# Patient Record
Sex: FEMALE | Race: WHITE
[De-identification: names, ages, dates, MRNs, and addresses within clinical notes are randomized per-mention and may not be internally consistent; named-entity substitution may affect disease eponyms.]

---

## 2017-12-24 ENCOUNTER — HEALTH MAINTENANCE LETTER (OUTPATIENT)
Age: 22
End: 2017-12-24

## 2020-01-17 ENCOUNTER — ANCILLARY PROCEDURE (OUTPATIENT)
Dept: GENERAL RADIOLOGY | Facility: CLINIC | Age: 25
End: 2020-01-17
Attending: INTERNAL MEDICINE
Payer: COMMERCIAL

## 2020-01-17 ENCOUNTER — OFFICE VISIT (OUTPATIENT)
Dept: URGENT CARE | Facility: URGENT CARE | Age: 25
End: 2020-01-17
Payer: COMMERCIAL

## 2020-01-17 VITALS
WEIGHT: 144 LBS | HEART RATE: 78 BPM | OXYGEN SATURATION: 97 % | SYSTOLIC BLOOD PRESSURE: 133 MMHG | BODY MASS INDEX: 21.27 KG/M2 | TEMPERATURE: 97.3 F | DIASTOLIC BLOOD PRESSURE: 76 MMHG

## 2020-01-17 DIAGNOSIS — M79.671 RIGHT FOOT PAIN: ICD-10-CM

## 2020-01-17 DIAGNOSIS — M79.672 LEFT FOOT PAIN: ICD-10-CM

## 2020-01-17 DIAGNOSIS — S93.602A FOOT SPRAIN, LEFT, INITIAL ENCOUNTER: Primary | ICD-10-CM

## 2020-01-17 PROCEDURE — 73630 X-RAY EXAM OF FOOT: CPT | Mod: LT

## 2020-01-17 PROCEDURE — 99203 OFFICE O/P NEW LOW 30 MIN: CPT

## 2020-01-18 NOTE — PATIENT INSTRUCTIONS
Wear ankle brace as directed.    Avoid prolonged standing/walking, running, jumping or any other activity that would strain your left ankle/foot until the pain completely resolves.    Seek immediate medical attention if you develop increasing swelling, severe pain, or numbness of the foot/toes.

## 2020-01-20 ASSESSMENT — ENCOUNTER SYMPTOMS
WEAKNESS: 0
NUMBNESS: 0
LIGHT-HEADEDNESS: 0

## 2020-01-21 NOTE — PROGRESS NOTES
Patient was demonstrating some gymnastic moves when she accidentally landed incorrectly and inverted her left foot at the ankle joint.  This was followed by swelling and pain at the joint.      Past medical history: Denies prior injury at left ankle      Review of Systems   Neurological: Negative for weakness, light-headedness and numbness.       /76   Pulse 78   Temp 97.3  F (36.3  C) (Oral)   Wt 65.3 kg (144 lb)   SpO2 97%   BMI 21.27 kg/m        Physical Exam  Constitutional:       General: She is not in acute distress.     Appearance: She is not ill-appearing.   Musculoskeletal: Normal range of motion.         General: Swelling and tenderness (Along fifth metatarsal on palpation and along dorsal lateral aspect of left foot during inversion) present.      Left lower leg: No edema.   Skin:     Findings: No erythema.   Neurological:      Mental Status: She is alert and oriented to person, place, and time.      Sensory: No sensory deficit.   Psychiatric:         Mood and Affect: Mood normal.         Behavior: Behavior normal.         ICD-10-CM    1. Foot sprain, left, initial encounter S93.602A order for DME   2. Left foot pain M79.672 XR Foot Left G/E 3 Views       Patient Instructions   Wear ankle brace as directed.    Avoid prolonged standing/walking, running, jumping or any other activity that would strain your left ankle/foot until the pain completely resolves.    Seek immediate medical attention if you develop increasing swelling, severe pain, or numbness of the foot/toes.

## 2020-03-02 ENCOUNTER — HEALTH MAINTENANCE LETTER (OUTPATIENT)
Age: 25
End: 2020-03-02

## 2020-12-20 ENCOUNTER — HEALTH MAINTENANCE LETTER (OUTPATIENT)
Age: 25
End: 2020-12-20

## 2021-04-24 ENCOUNTER — HEALTH MAINTENANCE LETTER (OUTPATIENT)
Age: 26
End: 2021-04-24

## 2021-06-01 ENCOUNTER — RECORDS - HEALTHEAST (OUTPATIENT)
Dept: ADMINISTRATIVE | Facility: CLINIC | Age: 26
End: 2021-06-01

## 2021-10-03 ENCOUNTER — HEALTH MAINTENANCE LETTER (OUTPATIENT)
Age: 26
End: 2021-10-03

## 2022-05-15 ENCOUNTER — HEALTH MAINTENANCE LETTER (OUTPATIENT)
Age: 27
End: 2022-05-15

## 2022-09-10 ENCOUNTER — HEALTH MAINTENANCE LETTER (OUTPATIENT)
Age: 27
End: 2022-09-10

## 2023-06-03 ENCOUNTER — HEALTH MAINTENANCE LETTER (OUTPATIENT)
Age: 28
End: 2023-06-03